# Patient Record
(demographics unavailable — no encounter records)

---

## 2025-01-17 NOTE — REVIEW OF SYSTEMS
[Negative] : Musculoskeletal [FreeTextEntry4] : + redness, pain, swelling to right pinna from infected ear piercing  [de-identified] : + red swollen warm right outer ear

## 2025-01-17 NOTE — PHYSICAL EXAM
[Normal] : normal sclera/conjunctiva, pupils are equal, round and reactive to light and extraocular movements are intact [de-identified] : + redness, + swelling, + warmth to right Cymba/ Pinna, tight skin noted, negative for pus/abcess/exudate  [de-identified] : Mild swelling noted just under right ear  [de-identified] : *see ear

## 2025-01-17 NOTE — HISTORY OF PRESENT ILLNESS
[FreeTextEntry8] : Pt presents with c/o red, swollen, painful outer right ear after Cymba/Josefina ear piercing 4 days ago. Denies fever, chills.  States that she feels swollen under her right ear as well.  Denies hearing changes.    Denies pregnancy  Pt refused removal of piercing att, would like to have it removed from piercing place that placed it.

## 2025-01-17 NOTE — PLAN
[FreeTextEntry1] : If develop fever, increased pain seek emergency medical attention at ED/[facility].   Remove piercing as discussed as soon as possible Start antibiotics as soon as possible as discussed; monitor progress.  If infection/symptoms persist follow up to PCP/[facility].   Educaton provided.    Follow up as needed.